# Patient Record
Sex: FEMALE | Race: WHITE
[De-identification: names, ages, dates, MRNs, and addresses within clinical notes are randomized per-mention and may not be internally consistent; named-entity substitution may affect disease eponyms.]

---

## 2017-11-17 ENCOUNTER — HOSPITAL ENCOUNTER (OUTPATIENT)
Dept: HOSPITAL 75 - RAD | Age: 57
End: 2017-11-17
Attending: NURSE PRACTITIONER
Payer: COMMERCIAL

## 2017-11-17 DIAGNOSIS — Z12.31: Primary | ICD-10-CM

## 2017-11-17 PROCEDURE — 77067 SCR MAMMO BI INCL CAD: CPT

## 2018-12-12 ENCOUNTER — HOSPITAL ENCOUNTER (OUTPATIENT)
Dept: HOSPITAL 75 - RAD | Age: 58
End: 2018-12-12
Attending: NURSE PRACTITIONER
Payer: COMMERCIAL

## 2018-12-12 DIAGNOSIS — Z12.31: Primary | ICD-10-CM

## 2018-12-12 PROCEDURE — 77067 SCR MAMMO BI INCL CAD: CPT

## 2018-12-13 NOTE — DIAGNOSTIC IMAGING REPORT
INDICATION: 

Screening.



COMPARISON:  

11/17/2017 back through 06/13/2012.



TECHNIQUE: 

Bilateral CC and MLO 3D mammography was performed. The current

study was also evaluated with a Computer Aided Detection (CAD)

system. 



FINDINGS: 

There has been bilateral breast augmentation with implants. There

is heterogeneously dense fibroglandular tissue bilaterally. There

are a few benign type calcifications. There is no dominant mass,

spiculated lesion, or suspicious calcification identified. The

skin, nipples, and axillae are unremarkable. 



IMPRESSION:   

Benign findings.



ACR BI-RADS Category 2: Benign findings.

Result letter will be mailed to the patient.

Note: At least 10% of breast cancer is not imaged by mammography.



Dictated by: 



  Dictated on workstation # [id number]

## 2020-03-12 ENCOUNTER — HOSPITAL ENCOUNTER (OUTPATIENT)
Dept: HOSPITAL 75 - RAD | Age: 60
End: 2020-03-12
Attending: NURSE PRACTITIONER
Payer: COMMERCIAL

## 2020-03-12 DIAGNOSIS — Z12.31: Primary | ICD-10-CM

## 2020-03-12 DIAGNOSIS — Z01.419: ICD-10-CM

## 2020-03-12 PROCEDURE — 77067 SCR MAMMO BI INCL CAD: CPT

## 2020-03-13 NOTE — DIAGNOSTIC IMAGING REPORT
INDICATION: 

Routine screening.



COMPARISON:  

12/12/2018 and 11/17/2017.



TECHNIQUE: 

2D and 3D bilateral screening mammography was performed with CAD.



FINDINGS:

Scattered fibroglandular densities are identified. There are

bilateral subpectoral breast implants. The implant contours

appear smooth. The parenchymal pattern appears stable. There are

benign parenchymal and vascular calcifications bilaterally. No

mass or malignant appearing microcalcifications are seen. The

axillae are unremarkable.



IMPRESSION: 

No mammographic features suspicious for malignancy are

identified.



ACR BI-RADS Category 2: Benign findings.

Result letter will be mailed to the patient.

Note: At least 10% of breast cancer is not imaged by mammography.



Dictated by: 



  Dictated on workstation # [id number]

## 2021-08-13 ENCOUNTER — HOSPITAL ENCOUNTER (OUTPATIENT)
Dept: HOSPITAL 75 - RAD | Age: 61
End: 2021-08-13
Attending: NURSE PRACTITIONER
Payer: COMMERCIAL

## 2021-08-13 DIAGNOSIS — Z12.31: Primary | ICD-10-CM

## 2021-08-13 PROCEDURE — 77063 BREAST TOMOSYNTHESIS BI: CPT

## 2021-08-13 PROCEDURE — 77067 SCR MAMMO BI INCL CAD: CPT

## 2021-08-13 NOTE — DIAGNOSTIC IMAGING REPORT
Indication: Routine screening.



Comparison is made with prior mammogram from 03/12/2020 and

12/12/2018.



2-D and 3-D bilateral screening mammography was performed with

CAD.



Patient's bilateral subpectoral breast implants. Implant contours

are smooth. Breast parenchymal is heterogeneously dense, limiting

the sensitivity of mammography. The parenchymal pattern is

stable. No mass or malignant-appearing microcalcifications are

seen. Axillae are unremarkable.



IMPRESSION: BI-RADS Category 2



No mammographic features suspicious for malignancy are

identified.



ACR BI-RADS Category 2: Benign findings.

Result letter will be mailed to the patient.

Note: At least 10% of breast cancer is not imaged by mammography.



Dictated by: 



  Dictated on workstation # [id number]

## 2022-09-26 ENCOUNTER — HOSPITAL ENCOUNTER (OUTPATIENT)
Dept: HOSPITAL 75 - RAD | Age: 62
End: 2022-09-26
Attending: SURGERY
Payer: COMMERCIAL

## 2022-09-26 DIAGNOSIS — Z12.31: Primary | ICD-10-CM

## 2022-09-26 PROCEDURE — 77063 BREAST TOMOSYNTHESIS BI: CPT

## 2022-09-26 PROCEDURE — 77067 SCR MAMMO BI INCL CAD: CPT

## 2022-09-27 NOTE — DIAGNOSTIC IMAGING REPORT
Indication: Routine screening.



Comparison is made with prior mammogram from 8/13/2021 and

3/12/2020.



2-D and 3-D bilateral screening mammography was performed with

CAD.



CAD is utilized.



The current study was also evaluated with a Computer Aided

Detection (CAD) system.



Bilateral breast implants are again noted. Implant contours are

smooth. There is no evidence of extracapsular rupture. Breast

parenchyma is heterogeneously dense, limiting the sensitivity of

mammography. The parenchymal pattern is stable. No mass or

malignant-appearing microcalcifications are identified.

Occasional benign calcifications are noted. Axillae are

unremarkable.



IMPRESSION: BI-RADS Category 2



No mammographic features suspicious for malignancy are

identified.



ACR BI-RADS Category 2: Benign findings.

Result letter will be mailed to the patient.

Note: At least 10% of breast cancer is not imaged by mammography.



Dictated by: 



  Dictated on workstation # [id number]